# Patient Record
Sex: MALE | Race: WHITE | URBAN - METROPOLITAN AREA
[De-identification: names, ages, dates, MRNs, and addresses within clinical notes are randomized per-mention and may not be internally consistent; named-entity substitution may affect disease eponyms.]

---

## 2022-01-21 ENCOUNTER — OFFICE VISIT (OUTPATIENT)
Dept: URBAN - METROPOLITAN AREA CLINIC 98 | Facility: CLINIC | Age: 82
End: 2022-01-21

## 2022-03-17 ENCOUNTER — OFFICE VISIT (OUTPATIENT)
Dept: URBAN - METROPOLITAN AREA CLINIC 98 | Facility: CLINIC | Age: 82
End: 2022-03-17
Payer: COMMERCIAL

## 2022-03-17 ENCOUNTER — WEB ENCOUNTER (OUTPATIENT)
Dept: URBAN - METROPOLITAN AREA CLINIC 98 | Facility: CLINIC | Age: 82
End: 2022-03-17

## 2022-03-17 DIAGNOSIS — R79.89 ABNORMAL LFTS: ICD-10-CM

## 2022-03-17 PROCEDURE — 99203 OFFICE O/P NEW LOW 30 MIN: CPT | Performed by: INTERNAL MEDICINE

## 2022-03-17 PROCEDURE — 99243 OFF/OP CNSLTJ NEW/EST LOW 30: CPT | Performed by: INTERNAL MEDICINE

## 2022-03-17 RX ORDER — VALSARTAN 160 MG/1
1 TABLET TABLET, FILM COATED ORAL ONCE A DAY
Status: ACTIVE | COMMUNITY

## 2022-03-17 RX ORDER — ATORVASTATIN CALCIUM 40 MG/1
1 TABLET TABLET, FILM COATED ORAL ONCE A DAY
Status: ACTIVE | COMMUNITY

## 2022-03-17 RX ORDER — UBIDECARENONE/VIT E ACET 100MG-5
AS DIRECTED CAPSULE ORAL
Status: ACTIVE | COMMUNITY

## 2022-03-17 RX ORDER — APIXABAN 5 MG/1
AS DIRECTED TABLET, FILM COATED ORAL
Status: ACTIVE | COMMUNITY

## 2022-03-17 RX ORDER — BUMETANIDE 0.5 MG/1
1 TABLET TABLET ORAL ONCE A DAY
Status: ACTIVE | COMMUNITY

## 2022-03-18 LAB
ALBUMIN: 4.7
ALKALINE PHOSPHATASE: 77
ALT (SGPT): 39
AST (SGOT): 45
BILIRUBIN, DIRECT: 0.5
BILIRUBIN, TOTAL: 1.7
GGT: 83
PROTEIN, TOTAL: 7.6

## 2022-04-15 ENCOUNTER — TELEPHONE ENCOUNTER (OUTPATIENT)
Dept: URBAN - METROPOLITAN AREA CLINIC 92 | Facility: CLINIC | Age: 82
End: 2022-04-15

## 2022-05-25 ENCOUNTER — TELEPHONE ENCOUNTER (OUTPATIENT)
Dept: URBAN - METROPOLITAN AREA CLINIC 6 | Facility: CLINIC | Age: 82
End: 2022-05-25

## 2022-06-10 ENCOUNTER — LAB OUTSIDE AN ENCOUNTER (OUTPATIENT)
Dept: URBAN - METROPOLITAN AREA CLINIC 98 | Facility: CLINIC | Age: 82
End: 2022-06-10

## 2022-06-10 LAB
CREATININE POC: 2.4
PERFORMING LAB: (no result)

## 2022-07-01 ENCOUNTER — OFFICE VISIT (OUTPATIENT)
Dept: URBAN - METROPOLITAN AREA CLINIC 98 | Facility: CLINIC | Age: 82
End: 2022-07-01
Payer: COMMERCIAL

## 2022-07-01 ENCOUNTER — WEB ENCOUNTER (OUTPATIENT)
Dept: URBAN - METROPOLITAN AREA CLINIC 98 | Facility: CLINIC | Age: 82
End: 2022-07-01

## 2022-07-01 VITALS
TEMPERATURE: 97.2 F | BODY MASS INDEX: 26.1 KG/M2 | WEIGHT: 176.2 LBS | SYSTOLIC BLOOD PRESSURE: 123 MMHG | HEIGHT: 69 IN | HEART RATE: 56 BPM | DIASTOLIC BLOOD PRESSURE: 76 MMHG

## 2022-07-01 DIAGNOSIS — R79.89 ABNORMAL LFTS: ICD-10-CM

## 2022-07-01 PROCEDURE — 99214 OFFICE O/P EST MOD 30 MIN: CPT | Performed by: INTERNAL MEDICINE

## 2022-07-01 RX ORDER — VALSARTAN 160 MG/1
1 TABLET TABLET, FILM COATED ORAL ONCE A DAY
Status: ACTIVE | COMMUNITY

## 2022-07-01 RX ORDER — UBIDECARENONE/VIT E ACET 100MG-5
AS DIRECTED CAPSULE ORAL
Status: ACTIVE | COMMUNITY

## 2022-07-01 RX ORDER — ATORVASTATIN CALCIUM 40 MG/1
1 TABLET TABLET, FILM COATED ORAL ONCE A DAY
Status: ACTIVE | COMMUNITY

## 2022-07-01 RX ORDER — BUMETANIDE 0.5 MG/1
1 TABLET TABLET ORAL ONCE A DAY
Status: ACTIVE | COMMUNITY

## 2022-07-01 RX ORDER — APIXABAN 5 MG/1
AS DIRECTED TABLET, FILM COATED ORAL
Status: ACTIVE | COMMUNITY

## 2022-07-01 NOTE — HPI-TODAY'S VISIT:
Patient referred by Dr. Rian Morris and note will be sent over.  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  Had a triple bypass.  No abdominal pain. Had a hernia repair.  No CIBH or bleeding.  Last colon 3 years ago and had a polyp. Told not to repeat.  Has intermittent reflux and uses PPI once a day.  Present - MRI and US done - mild fatty liver noted- no masses - trying to cut down ETOH

## 2022-07-14 ENCOUNTER — LAB OUTSIDE AN ENCOUNTER (OUTPATIENT)
Dept: URBAN - METROPOLITAN AREA CLINIC 98 | Facility: CLINIC | Age: 82
End: 2022-07-14

## 2022-07-15 LAB
ALBUMIN: 4.8
ALKALINE PHOSPHATASE: 48
ALT (SGPT): 44
AST (SGOT): 35
BILIRUBIN, DIRECT: 0.4
BILIRUBIN, TOTAL: 1.9
CHOLESTEROL, TOTAL: 107
COMMENT:: (no result)
HDL CHOLESTEROL: 55
LDL CHOL CALC (NIH): 38
PROTEIN, TOTAL: 7.4
TRIGLYCERIDES: 66
VLDL CHOLESTEROL CAL: 14

## 2022-09-09 ENCOUNTER — TELEPHONE ENCOUNTER (OUTPATIENT)
Dept: URBAN - METROPOLITAN AREA CLINIC 63 | Facility: CLINIC | Age: 82
End: 2022-09-09

## 2023-04-28 ENCOUNTER — OFFICE VISIT (OUTPATIENT)
Dept: URBAN - METROPOLITAN AREA CLINIC 98 | Facility: CLINIC | Age: 83
End: 2023-04-28
Payer: COMMERCIAL

## 2023-04-28 VITALS
HEIGHT: 69 IN | WEIGHT: 177 LBS | BODY MASS INDEX: 26.22 KG/M2 | SYSTOLIC BLOOD PRESSURE: 117 MMHG | HEART RATE: 66 BPM | DIASTOLIC BLOOD PRESSURE: 71 MMHG | TEMPERATURE: 97.2 F

## 2023-04-28 DIAGNOSIS — R79.89 ABNORMAL LFTS: ICD-10-CM

## 2023-04-28 PROCEDURE — 99214 OFFICE O/P EST MOD 30 MIN: CPT | Performed by: INTERNAL MEDICINE

## 2023-04-28 RX ORDER — BUMETANIDE 0.5 MG/1
1 TABLET TABLET ORAL ONCE A DAY
Status: ACTIVE | COMMUNITY

## 2023-04-28 RX ORDER — UBIDECARENONE/VIT E ACET 100MG-5
AS DIRECTED CAPSULE ORAL
Status: ACTIVE | COMMUNITY

## 2023-04-28 RX ORDER — VALSARTAN 160 MG/1
1 TABLET TABLET, FILM COATED ORAL ONCE A DAY
Status: ACTIVE | COMMUNITY

## 2023-04-28 RX ORDER — ATORVASTATIN CALCIUM 40 MG/1
1 TABLET TABLET, FILM COATED ORAL ONCE A DAY
Status: ACTIVE | COMMUNITY

## 2023-04-28 RX ORDER — APIXABAN 5 MG/1
AS DIRECTED TABLET, FILM COATED ORAL
Status: ACTIVE | COMMUNITY

## 2023-04-28 NOTE — HPI-TODAY'S VISIT:
Patient referred by Dr. Rian Morris and note will be sent over.  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  Had a triple bypass.  No abdominal pain. Had a hernia repair.  No CIBH or bleeding.  Last colon 3 years ago and had a polyp. Told not to repeat.  Has intermittent reflux and uses PPI once a day.  Present - MRI and US done - mild fatty liver noted- no masses - trying to cut down ETOH Patient referred by Dr. Rian Morris and note will be sent over.  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  Had a triple bypass.  No abdominal pain. Had a hernia repair.  No CIBH or bleeding.  Last colon 3 years ago and had a polyp. Told not to repeat.  Has intermittent reflux and uses PPI once a day.  Present - MRI and US done - mild fatty liver noted- no masses - Told to have elevated LFT - 1-2 bottles of wine per day- did not cut down before - now down to one glass per day.  - Feels well.  - Clear urine

## 2023-04-29 LAB
ALBUMIN/GLOBULIN RATIO: 1.6
ALBUMIN: 4.6
ALKALINE PHOSPHATASE: 41
ALT (SGPT): 50
AST (SGOT): 45
BILIRUBIN, DIRECT: 0.4
BILIRUBIN, INDIRECT: 1.5
BILIRUBIN, TOTAL: 1.9
GLOBULIN: 2.8
INR: 1.1
PROTEIN, TOTAL: 7.4
PT: 11.3

## 2023-05-02 ENCOUNTER — TELEPHONE ENCOUNTER (OUTPATIENT)
Dept: URBAN - METROPOLITAN AREA CLINIC 95 | Facility: CLINIC | Age: 83
End: 2023-05-02

## 2023-05-12 ENCOUNTER — TELEPHONE ENCOUNTER (OUTPATIENT)
Dept: URBAN - METROPOLITAN AREA CLINIC 23 | Facility: CLINIC | Age: 83
End: 2023-05-12

## 2023-12-08 ENCOUNTER — TELEPHONE ENCOUNTER (OUTPATIENT)
Dept: URBAN - METROPOLITAN AREA CLINIC 98 | Facility: CLINIC | Age: 83
End: 2023-12-08

## 2023-12-08 RX ORDER — PANTOPRAZOLE 40 MG/1
1 TABLET TABLET, DELAYED RELEASE ORAL ONCE A DAY
Qty: 90 TABLET | Refills: 4 | OUTPATIENT
Start: 2023-12-08

## 2023-12-22 ENCOUNTER — LAB OUTSIDE AN ENCOUNTER (OUTPATIENT)
Dept: URBAN - METROPOLITAN AREA CLINIC 98 | Facility: CLINIC | Age: 83
End: 2023-12-22

## 2023-12-22 ENCOUNTER — OFFICE VISIT (OUTPATIENT)
Dept: URBAN - METROPOLITAN AREA CLINIC 98 | Facility: CLINIC | Age: 83
End: 2023-12-22
Payer: COMMERCIAL

## 2023-12-22 VITALS
HEIGHT: 69 IN | HEART RATE: 75 BPM | WEIGHT: 184 LBS | SYSTOLIC BLOOD PRESSURE: 116 MMHG | TEMPERATURE: 97.2 F | DIASTOLIC BLOOD PRESSURE: 70 MMHG | BODY MASS INDEX: 27.25 KG/M2

## 2023-12-22 DIAGNOSIS — K21.9 GASTROESOPHAGEAL REFLUX DISEASE, UNSPECIFIED WHETHER ESOPHAGITIS PRESENT: ICD-10-CM

## 2023-12-22 DIAGNOSIS — R11.0 NAUSEA: ICD-10-CM

## 2023-12-22 DIAGNOSIS — K76.0 FATTY LIVER: ICD-10-CM

## 2023-12-22 DIAGNOSIS — R10.9 ABDOMINAL DISCOMFORT: ICD-10-CM

## 2023-12-22 PROBLEM — 235595009: Status: ACTIVE | Noted: 2023-12-22

## 2023-12-22 PROBLEM — 197321007: Status: ACTIVE | Noted: 2023-12-22

## 2023-12-22 PROCEDURE — 99214 OFFICE O/P EST MOD 30 MIN: CPT

## 2023-12-22 RX ORDER — APIXABAN 5 MG/1
AS DIRECTED TABLET, FILM COATED ORAL
Status: ACTIVE | COMMUNITY

## 2023-12-22 RX ORDER — ONDANSETRON HYDROCHLORIDE 4 MG/1
1 TABLET TABLET, FILM COATED ORAL ONCE A DAY
Qty: 30 TABLET | Refills: 0 | OUTPATIENT
Start: 2023-12-22

## 2023-12-22 RX ORDER — BUMETANIDE 0.5 MG/1
1 TABLET TABLET ORAL ONCE A DAY
Status: ACTIVE | COMMUNITY

## 2023-12-22 RX ORDER — ATORVASTATIN CALCIUM 40 MG/1
1 TABLET TABLET, FILM COATED ORAL ONCE A DAY
Status: ACTIVE | COMMUNITY

## 2023-12-22 RX ORDER — PANTOPRAZOLE 40 MG/1
1 TABLET TABLET, DELAYED RELEASE ORAL ONCE A DAY
Qty: 90 TABLET | Refills: 4 | Status: ACTIVE | COMMUNITY
Start: 2023-12-08

## 2023-12-22 RX ORDER — UBIDECARENONE/VIT E ACET 100MG-5
AS DIRECTED CAPSULE ORAL
Status: ACTIVE | COMMUNITY

## 2023-12-22 RX ORDER — DICYCLOMINE HYDROCHLORIDE 10 MG/1
1 CAPSULE 30 MINUTES BEFORE EATING CAPSULE ORAL THREE TIMES A DAY
Qty: 90 | Refills: 0 | OUTPATIENT
Start: 2023-12-22 | End: 2024-01-21

## 2023-12-22 RX ORDER — VALSARTAN 160 MG/1
1 TABLET TABLET, FILM COATED ORAL ONCE A DAY
Status: ACTIVE | COMMUNITY

## 2023-12-22 NOTE — HPI-TODAY'S VISIT:
Mr. Newton is a 83 year old male patient of Dr. Tran. PCP Dr. Sara Segura; progress note will be sent following vist. Prior visit Patient referred by Dr. Rian Morris and note will be sent over.  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  Had a triple bypass.  No abdominal pain. Had a hernia repair.  No CIBH or bleeding.  Last colon 3 years ago and had a polyp. Told not to repeat.  Has intermittent reflux and uses PPI once a day. Prior visit - MRI and US done - mild fatty liver noted- no masses - trying to cut down ETOH Patient referred by Dr. Rian Morris and note will be sent over.  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  Had a triple bypass.  No abdominal pain. Had a hernia repair.  No CIBH or bleeding.  Last colon 3 years ago and had a polyp. Told not to repeat.  Has intermittent reflux and uses PPI once a day. prior visit - MRI and US done - mild fatty liver noted- no masses - Told to have elevated LFT - 1-2 bottles of wine per day- did not cut down before - now down to one glass per day.  - Feels well.  - Clear urine Today 12/22/2023   - Here with increased nausea with daily episodes - Over the past several months has noticed increased nausea - Denies heartburn, regurgitation - No vomiting, or dysphagia - Not correlated with diet - Nothing seems to make it better or worse - Spontaneously resolves - Has not changed the way he eats - Still drinking a couple glasses of wine daily - Takes pantoprazole 40 mg daily - Sees cardiologist every 6 months Dr. Rian Morris - BM daily and formed - Recently referred to hematologist for protein in blood - Failed pulmonary function test last week > prescribed inhalers and follow-up in 2 weeks Dr. Esther Ji Yeon Lee

## 2023-12-23 LAB
A/G RATIO: 1.8
ABSOLUTE BASOPHILS: 83
ABSOLUTE EOSINOPHILS: 100
ABSOLUTE LYMPHOCYTES: 1363
ABSOLUTE MONOCYTES: 502
ABSOLUTE NEUTROPHILS: 3853
ALBUMIN: 4.8
ALKALINE PHOSPHATASE: 42
ALT (SGPT): 46
AMYLASE: 54
AST (SGOT): 40
BASOPHILS: 1.4
BILIRUBIN, TOTAL: 1.7
BUN/CREATININE RATIO: 17
BUN: 44
CALCIUM: 10.4
CARBON DIOXIDE, TOTAL: 25
CHLORIDE: 100
CREATININE: 2.6
EGFR: 24
EOSINOPHILS: 1.7
GLOBULIN, TOTAL: 2.7
GLUCOSE: 98
HEMATOCRIT: 40.2
HEMOGLOBIN: 13.6
LIPASE: 54
LYMPHOCYTES: 23.1
MCH: 35.1
MCHC: 33.8
MCV: 103.6
MONOCYTES: 8.5
MPV: 10.2
NEUTROPHILS: 65.3
PLATELET COUNT: 114
POTASSIUM: 5.1
PROTEIN, TOTAL: 7.5
RDW: 13.4
RED BLOOD CELL COUNT: 3.88
SODIUM: 140
WHITE BLOOD CELL COUNT: 5.9

## 2024-01-09 ENCOUNTER — TELEPHONE ENCOUNTER (OUTPATIENT)
Dept: URBAN - METROPOLITAN AREA CLINIC 98 | Facility: CLINIC | Age: 84
End: 2024-01-09

## 2024-01-18 ENCOUNTER — TELEPHONE ENCOUNTER (OUTPATIENT)
Dept: URBAN - METROPOLITAN AREA CLINIC 98 | Facility: CLINIC | Age: 84
End: 2024-01-18

## 2024-02-28 ENCOUNTER — EGD (OUTPATIENT)
Dept: URBAN - METROPOLITAN AREA MEDICAL CENTER 28 | Facility: MEDICAL CENTER | Age: 84
End: 2024-02-28

## 2024-02-28 RX ORDER — ONDANSETRON HYDROCHLORIDE 4 MG/1
1 TABLET TABLET, FILM COATED ORAL ONCE A DAY
Qty: 30 TABLET | Refills: 0 | Status: ACTIVE | COMMUNITY
Start: 2023-12-22

## 2024-02-28 RX ORDER — VALSARTAN 160 MG/1
1 TABLET TABLET, FILM COATED ORAL ONCE A DAY
Status: ACTIVE | COMMUNITY

## 2024-02-28 RX ORDER — PANTOPRAZOLE 40 MG/1
1 TABLET TABLET, DELAYED RELEASE ORAL ONCE A DAY
Qty: 90 TABLET | Refills: 4 | Status: ACTIVE | COMMUNITY
Start: 2023-12-08

## 2024-02-28 RX ORDER — ATORVASTATIN CALCIUM 40 MG/1
1 TABLET TABLET, FILM COATED ORAL ONCE A DAY
Status: ACTIVE | COMMUNITY

## 2024-02-28 RX ORDER — UBIDECARENONE/VIT E ACET 100MG-5
AS DIRECTED CAPSULE ORAL
Status: ACTIVE | COMMUNITY

## 2024-02-28 RX ORDER — BUMETANIDE 0.5 MG/1
1 TABLET TABLET ORAL ONCE A DAY
Status: ACTIVE | COMMUNITY

## 2024-02-28 RX ORDER — APIXABAN 5 MG/1
AS DIRECTED TABLET, FILM COATED ORAL
Status: ACTIVE | COMMUNITY

## 2024-03-27 ENCOUNTER — LAB (OUTPATIENT)
Dept: URBAN - METROPOLITAN AREA CLINIC 98 | Facility: CLINIC | Age: 84
End: 2024-03-27

## 2024-03-27 ENCOUNTER — OV EP (OUTPATIENT)
Dept: URBAN - METROPOLITAN AREA CLINIC 98 | Facility: CLINIC | Age: 84
End: 2024-03-27
Payer: COMMERCIAL

## 2024-03-27 VITALS
TEMPERATURE: 97 F | WEIGHT: 177 LBS | SYSTOLIC BLOOD PRESSURE: 89 MMHG | HEART RATE: 86 BPM | DIASTOLIC BLOOD PRESSURE: 77 MMHG | RESPIRATION RATE: 18 BRPM | BODY MASS INDEX: 26.22 KG/M2 | HEIGHT: 69 IN

## 2024-03-27 DIAGNOSIS — R10.9 ABDOMINAL DISCOMFORT: ICD-10-CM

## 2024-03-27 DIAGNOSIS — K21.9 GASTROESOPHAGEAL REFLUX DISEASE, UNSPECIFIED WHETHER ESOPHAGITIS PRESENT: ICD-10-CM

## 2024-03-27 DIAGNOSIS — R11.0 NAUSEA: ICD-10-CM

## 2024-03-27 DIAGNOSIS — K76.0 FATTY LIVER: ICD-10-CM

## 2024-03-27 DIAGNOSIS — R63.4 WEIGHT LOSS: ICD-10-CM

## 2024-03-27 PROCEDURE — 99214 OFFICE O/P EST MOD 30 MIN: CPT

## 2024-03-27 RX ORDER — VALSARTAN 160 MG/1
1 TABLET TABLET, FILM COATED ORAL ONCE A DAY
Status: ACTIVE | COMMUNITY

## 2024-03-27 RX ORDER — ATORVASTATIN CALCIUM 40 MG/1
1 TABLET TABLET, FILM COATED ORAL ONCE A DAY
Status: ACTIVE | COMMUNITY

## 2024-03-27 RX ORDER — BUMETANIDE 0.5 MG/1
1 TABLET TABLET ORAL ONCE A DAY
Status: ACTIVE | COMMUNITY

## 2024-03-27 RX ORDER — PANTOPRAZOLE 40 MG/1
1 TABLET TABLET, DELAYED RELEASE ORAL ONCE A DAY
Qty: 90 TABLET | Refills: 4 | Status: ACTIVE | COMMUNITY
Start: 2023-12-08

## 2024-03-27 RX ORDER — DICYCLOMINE HYDROCHLORIDE 10 MG/1
1 TABLET CAPSULE ORAL THREE TIMES A DAY
Qty: 90 | Refills: 3 | Status: ACTIVE | COMMUNITY
Start: 2024-02-28 | End: 2024-06-27

## 2024-03-27 RX ORDER — APIXABAN 5 MG/1
AS DIRECTED TABLET, FILM COATED ORAL
Status: ACTIVE | COMMUNITY

## 2024-03-27 RX ORDER — UBIDECARENONE/VIT E ACET 100MG-5
AS DIRECTED CAPSULE ORAL
Status: ACTIVE | COMMUNITY

## 2024-03-27 RX ORDER — ONDANSETRON HYDROCHLORIDE 4 MG/1
1 TABLET TABLET, FILM COATED ORAL ONCE A DAY
Qty: 30 TABLET | Refills: 0 | Status: ACTIVE | COMMUNITY
Start: 2023-12-22

## 2024-03-27 NOTE — HPI-TODAY'S VISIT:
Mr. Newton is a 83 year old male patient of Dr. Tran. PCP Dr. Sara Segura; progress note will be sent following vist. Prior visit Patient referred by Dr. Rian Morris and note will be sent over.  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  Had a triple bypass.  No abdominal pain. Had a hernia repair.  No CIBH or bleeding.  Last colon 3 years ago and had a polyp. Told not to repeat.  Has intermittent reflux and uses PPI once a day. Prior visit - MRI and US done - mild fatty liver noted- no masses - trying to cut down ETOH Patient referred by Dr. Rian Morris and note will be sent over.  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  Had a triple bypass.  No abdominal pain. Had a hernia repair.  No CIBH or bleeding.  Last colon 3 years ago and had a polyp. Told not to repeat.  Has intermittent reflux and uses PPI once a day. prior visit - MRI and US done - mild fatty liver noted- no masses - Told to have elevated LFT - 1-2 bottles of wine per day- did not cut down before - now down to one glass per day.  - Feels well.  - Clear urine Today 12/22/2023- Subha Berry NP - Here with increased nausea with daily episodes - Over the past several months has noticed increased nausea - Denies heartburn, regurgitation - No vomiting, or dysphagia - Not correlated with diet - Nothing seems to make it better or worse - Spontaneously resolves - Has not changed the way he eats - Still drinking a couple glasses of wine daily - Takes pantoprazole 40 mg daily - Sees cardiologist every 6 months Dr. Rian Morris - BM daily and formed - Recently referred to hematologist for protein in blood - Failed pulmonary function test last week > prescribed inhalers and follow-up in 2 weeks Dr. Esther Ji Yeon Lee Visit 3/27/24- Subha Berry NP - Here to follow-up after EGD for nausea - EGD 2/28/24- esophagus and stomach bx with mild chronic inflammation - Taking dicyclomine 3 times a day and pantoprazole 1 time per day - Still has nausea at times but not daily - No vomiting; dry heaves - Decreased appetite for the past 2 months - Epigastric pain intermittent no correlation with diet

## 2024-03-28 LAB
A/G RATIO: 1.9
ALBUMIN: 5
ALKALINE PHOSPHATASE: 53
ALT (SGPT): 49
AST (SGOT): 31
BASO (ABSOLUTE): 0.1
BASOS: 1
BILIRUBIN, TOTAL: 1.2
BUN/CREATININE RATIO: 20
BUN: 90
CALCIUM: 11.2
CARBON DIOXIDE, TOTAL: 18
CHLORIDE: 97
CREATININE: 4.42
EGFR: 13
EOS (ABSOLUTE): 0.1
EOS: 2
GGT: 122
GLOBULIN, TOTAL: 2.7
GLUCOSE: 117
HEMATOCRIT: 42.1
HEMATOLOGY COMMENTS:: (no result)
HEMOGLOBIN: 13.8
IMMATURE CELLS: (no result)
IMMATURE GRANS (ABS): 0
IMMATURE GRANULOCYTES: 0
INR: 1.1
LYMPHS (ABSOLUTE): 2
LYMPHS: 30
MCH: 34.3
MCHC: 32.8
MCV: 105
MONOCYTES(ABSOLUTE): 0.7
MONOCYTES: 10
NEUTROPHILS (ABSOLUTE): 3.9
NEUTROPHILS: 57
NRBC: (no result)
PLATELETS: 122
POTASSIUM: 5.6
PROTEIN, TOTAL: 7.7
PROTHROMBIN TIME: 11.6
RBC: 4.02
RDW: 12.4
SODIUM: 135
WBC: 6.8

## 2024-05-02 NOTE — PHYSICAL EXAM CONSTITUTIONAL:
well developed, well nourished , in no acute distress , ambulating without difficulty , normal communication ability
How Severe Are Your Spot(S)?: mild
Have Your Spot(S) Been Treated In The Past?: has not been treated
Hpi Title: Evaluation of Skin Lesions